# Patient Record
Sex: MALE | Race: WHITE | NOT HISPANIC OR LATINO | URBAN - METROPOLITAN AREA
[De-identification: names, ages, dates, MRNs, and addresses within clinical notes are randomized per-mention and may not be internally consistent; named-entity substitution may affect disease eponyms.]

---

## 2018-03-28 ENCOUNTER — OUTPATIENT (OUTPATIENT)
Dept: OUTPATIENT SERVICES | Facility: HOSPITAL | Age: 58
LOS: 1 days | End: 2018-03-28
Payer: MEDICARE

## 2018-03-28 VITALS
HEIGHT: 71 IN | SYSTOLIC BLOOD PRESSURE: 141 MMHG | OXYGEN SATURATION: 98 % | WEIGHT: 206.13 LBS | HEART RATE: 77 BPM | DIASTOLIC BLOOD PRESSURE: 86 MMHG | RESPIRATION RATE: 12 BRPM | TEMPERATURE: 98 F

## 2018-03-28 VITALS — WEIGHT: 210.98 LBS | HEIGHT: 71 IN

## 2018-03-28 DIAGNOSIS — Z98.89 OTHER SPECIFIED POSTPROCEDURAL STATES: Chronic | ICD-10-CM

## 2018-03-28 DIAGNOSIS — Z01.810 ENCOUNTER FOR PREPROCEDURAL CARDIOVASCULAR EXAMINATION: ICD-10-CM

## 2018-03-28 DIAGNOSIS — I20.9 ANGINA PECTORIS, UNSPECIFIED: ICD-10-CM

## 2018-03-28 LAB
ALBUMIN SERPL ELPH-MCNC: 4 G/DL — SIGNIFICANT CHANGE UP (ref 3.3–5.2)
ALP SERPL-CCNC: 67 U/L — SIGNIFICANT CHANGE UP (ref 40–120)
ALT FLD-CCNC: 29 U/L — SIGNIFICANT CHANGE UP
ANION GAP SERPL CALC-SCNC: 12 MMOL/L — SIGNIFICANT CHANGE UP (ref 5–17)
APTT BLD: 30 SEC — SIGNIFICANT CHANGE UP (ref 27.5–37.4)
AST SERPL-CCNC: 26 U/L — SIGNIFICANT CHANGE UP
BILIRUB SERPL-MCNC: 0.3 MG/DL — LOW (ref 0.4–2)
BUN SERPL-MCNC: 20 MG/DL — SIGNIFICANT CHANGE UP (ref 8–20)
CALCIUM SERPL-MCNC: 9.1 MG/DL — SIGNIFICANT CHANGE UP (ref 8.6–10.2)
CHLORIDE SERPL-SCNC: 105 MMOL/L — SIGNIFICANT CHANGE UP (ref 98–107)
CO2 SERPL-SCNC: 23 MMOL/L — SIGNIFICANT CHANGE UP (ref 22–29)
CREAT SERPL-MCNC: 1.09 MG/DL — SIGNIFICANT CHANGE UP (ref 0.5–1.3)
GLUCOSE SERPL-MCNC: 164 MG/DL — HIGH (ref 70–115)
HCT VFR BLD CALC: 43.2 % — SIGNIFICANT CHANGE UP (ref 42–52)
HGB BLD-MCNC: 14.7 G/DL — SIGNIFICANT CHANGE UP (ref 14–18)
INR BLD: 0.98 RATIO — SIGNIFICANT CHANGE UP (ref 0.88–1.16)
MCHC RBC-ENTMCNC: 29.8 PG — SIGNIFICANT CHANGE UP (ref 27–31)
MCHC RBC-ENTMCNC: 34 G/DL — SIGNIFICANT CHANGE UP (ref 32–36)
MCV RBC AUTO: 87.4 FL — SIGNIFICANT CHANGE UP (ref 80–94)
PLATELET # BLD AUTO: 172 K/UL — SIGNIFICANT CHANGE UP (ref 150–400)
POTASSIUM SERPL-MCNC: 4.8 MMOL/L — SIGNIFICANT CHANGE UP (ref 3.5–5.3)
POTASSIUM SERPL-SCNC: 4.8 MMOL/L — SIGNIFICANT CHANGE UP (ref 3.5–5.3)
PROT SERPL-MCNC: 7.1 G/DL — SIGNIFICANT CHANGE UP (ref 6.6–8.7)
PROTHROM AB SERPL-ACNC: 10.8 SEC — SIGNIFICANT CHANGE UP (ref 9.8–12.7)
RBC # BLD: 4.94 M/UL — SIGNIFICANT CHANGE UP (ref 4.6–6.2)
RBC # FLD: 13.2 % — SIGNIFICANT CHANGE UP (ref 11–15.6)
SODIUM SERPL-SCNC: 140 MMOL/L — SIGNIFICANT CHANGE UP (ref 135–145)
WBC # BLD: 7 K/UL — SIGNIFICANT CHANGE UP (ref 4.8–10.8)
WBC # FLD AUTO: 7 K/UL — SIGNIFICANT CHANGE UP (ref 4.8–10.8)

## 2018-03-28 PROCEDURE — 93010 ELECTROCARDIOGRAM REPORT: CPT

## 2018-03-28 PROCEDURE — 85027 COMPLETE CBC AUTOMATED: CPT

## 2018-03-28 PROCEDURE — 93005 ELECTROCARDIOGRAM TRACING: CPT

## 2018-03-28 PROCEDURE — 36415 COLL VENOUS BLD VENIPUNCTURE: CPT

## 2018-03-28 PROCEDURE — 85610 PROTHROMBIN TIME: CPT

## 2018-03-28 PROCEDURE — 85730 THROMBOPLASTIN TIME PARTIAL: CPT

## 2018-03-28 PROCEDURE — G0463: CPT

## 2018-03-28 PROCEDURE — 80053 COMPREHEN METABOLIC PANEL: CPT

## 2018-03-28 NOTE — H&P PST ADULT - ASSESSMENT
This is a 57 year old white male with PMHx of 12 ANDREEA to date with catheterizations in 2007 and 2009.  Patient now c/o increased SOB when walking up stairs and reports SOB at night while in bed.  CCS class 3-4.  PMHx significant for HTN, HLD, DMII, CAD, and past tobacco use approximately 12 pack year history, quit in 2003.    Patient for [facility] 7/3/2018.  (Patient to have BW for Hepatitis C in am on day of procedure)

## 2018-03-28 NOTE — ASU PATIENT PROFILE, ADULT - PMH
Carotid artery disease    Chronic back pain    Coronary artery disease    Diabetes mellitus    HTN (hypertension)    Hyperlipidemia    Mitral valve disease

## 2018-03-28 NOTE — H&P PST ADULT - HISTORY OF PRESENT ILLNESS
This is a 57 year old white male with PMHx of 12 ANDREEA to date with catheterizations in 2007 and 2009.  Patient now c/o increased SOB when walking up stairs and reports SOB at night while in bed.  CCS class 3-4.  PMHx significant for HTN, HLD, DMII, CAD, and past tobacco use approximately 12 pack year history, quit in 2003.    NST 3/15/2018:  Negative for evidence of ischemia and EF of 59%.    Echo 3/9/2018:  Mild LV hypertrophy, EF 55-60% and mild MV regurgitation.

## 2018-04-02 ENCOUNTER — INPATIENT (INPATIENT)
Facility: HOSPITAL | Age: 58
LOS: 0 days | Discharge: HOME | DRG: 246 | End: 2018-04-03
Attending: SPECIALIST | Admitting: SPECIALIST
Payer: MEDICARE

## 2018-04-02 ENCOUNTER — TRANSCRIPTION ENCOUNTER (OUTPATIENT)
Age: 58
End: 2018-04-02

## 2018-04-02 VITALS
HEART RATE: 66 BPM | RESPIRATION RATE: 18 BRPM | OXYGEN SATURATION: 97 % | DIASTOLIC BLOOD PRESSURE: 86 MMHG | SYSTOLIC BLOOD PRESSURE: 146 MMHG | TEMPERATURE: 98 F

## 2018-04-02 DIAGNOSIS — Z98.89 OTHER SPECIFIED POSTPROCEDURAL STATES: Chronic | ICD-10-CM

## 2018-04-02 DIAGNOSIS — I20.9 ANGINA PECTORIS, UNSPECIFIED: ICD-10-CM

## 2018-04-02 LAB
CHOLEST SERPL-MCNC: 158 MG/DL — SIGNIFICANT CHANGE UP (ref 110–199)
GLUCOSE BLDC GLUCOMTR-MCNC: 137 MG/DL — HIGH (ref 70–99)
GLUCOSE BLDC GLUCOMTR-MCNC: 203 MG/DL — HIGH (ref 70–99)
GLUCOSE BLDC GLUCOMTR-MCNC: 211 MG/DL — HIGH (ref 70–99)
HDLC SERPL-MCNC: 49 MG/DL — LOW
LIPID PNL WITH DIRECT LDL SERPL: 102 MG/DL — SIGNIFICANT CHANGE UP
TOTAL CHOLESTEROL/HDL RATIO MEASUREMENT: 3 RATIO — LOW (ref 3.4–9.6)
TRIGL SERPL-MCNC: 37 MG/DL — SIGNIFICANT CHANGE UP (ref 10–200)

## 2018-04-02 PROCEDURE — 93010 ELECTROCARDIOGRAM REPORT: CPT

## 2018-04-02 RX ORDER — DEXTROSE 50 % IN WATER 50 %
25 SYRINGE (ML) INTRAVENOUS ONCE
Qty: 0 | Refills: 0 | Status: DISCONTINUED | OUTPATIENT
Start: 2018-04-02 | End: 2018-04-03

## 2018-04-02 RX ORDER — ATORVASTATIN CALCIUM 80 MG/1
80 TABLET, FILM COATED ORAL AT BEDTIME
Qty: 0 | Refills: 0 | Status: DISCONTINUED | OUTPATIENT
Start: 2018-04-02 | End: 2018-04-03

## 2018-04-02 RX ORDER — ISOSORBIDE MONONITRATE 60 MG/1
30 TABLET, EXTENDED RELEASE ORAL DAILY
Qty: 0 | Refills: 0 | Status: DISCONTINUED | OUTPATIENT
Start: 2018-04-02 | End: 2018-04-03

## 2018-04-02 RX ORDER — HYDRALAZINE HCL 50 MG
5 TABLET ORAL ONCE
Qty: 0 | Refills: 0 | Status: COMPLETED | OUTPATIENT
Start: 2018-04-02 | End: 2018-04-02

## 2018-04-02 RX ORDER — DEXTROSE 50 % IN WATER 50 %
12.5 SYRINGE (ML) INTRAVENOUS ONCE
Qty: 0 | Refills: 0 | Status: DISCONTINUED | OUTPATIENT
Start: 2018-04-02 | End: 2018-04-03

## 2018-04-02 RX ORDER — ASPIRIN/CALCIUM CARB/MAGNESIUM 324 MG
325 TABLET ORAL ONCE
Qty: 0 | Refills: 0 | Status: COMPLETED | OUTPATIENT
Start: 2018-04-02 | End: 2018-04-02

## 2018-04-02 RX ORDER — ASPIRIN/CALCIUM CARB/MAGNESIUM 324 MG
81 TABLET ORAL DAILY
Qty: 0 | Refills: 0 | Status: DISCONTINUED | OUTPATIENT
Start: 2018-04-03 | End: 2018-04-03

## 2018-04-02 RX ORDER — GLUCAGON INJECTION, SOLUTION 0.5 MG/.1ML
1 INJECTION, SOLUTION SUBCUTANEOUS ONCE
Qty: 0 | Refills: 0 | Status: DISCONTINUED | OUTPATIENT
Start: 2018-04-02 | End: 2018-04-03

## 2018-04-02 RX ORDER — INSULIN GLARGINE 100 [IU]/ML
70 INJECTION, SOLUTION SUBCUTANEOUS
Qty: 0 | Refills: 0 | COMMUNITY

## 2018-04-02 RX ORDER — INSULIN LISPRO 100/ML
VIAL (ML) SUBCUTANEOUS
Qty: 0 | Refills: 0 | Status: DISCONTINUED | OUTPATIENT
Start: 2018-04-02 | End: 2018-04-03

## 2018-04-02 RX ORDER — ACETAMINOPHEN 500 MG
1000 TABLET ORAL ONCE
Qty: 0 | Refills: 0 | Status: COMPLETED | OUTPATIENT
Start: 2018-04-02 | End: 2018-04-02

## 2018-04-02 RX ORDER — METOPROLOL TARTRATE 50 MG
25 TABLET ORAL DAILY
Qty: 0 | Refills: 0 | Status: DISCONTINUED | OUTPATIENT
Start: 2018-04-02 | End: 2018-04-03

## 2018-04-02 RX ORDER — TICAGRELOR 90 MG/1
90 TABLET ORAL
Qty: 0 | Refills: 0 | Status: DISCONTINUED | OUTPATIENT
Start: 2018-04-02 | End: 2018-04-03

## 2018-04-02 RX ORDER — SODIUM CHLORIDE 9 MG/ML
1000 INJECTION, SOLUTION INTRAVENOUS
Qty: 0 | Refills: 0 | Status: DISCONTINUED | OUTPATIENT
Start: 2018-04-02 | End: 2018-04-03

## 2018-04-02 RX ORDER — INSULIN LISPRO 100/ML
VIAL (ML) SUBCUTANEOUS AT BEDTIME
Qty: 0 | Refills: 0 | Status: DISCONTINUED | OUTPATIENT
Start: 2018-04-02 | End: 2018-04-03

## 2018-04-02 RX ORDER — DEXTROSE 50 % IN WATER 50 %
1 SYRINGE (ML) INTRAVENOUS ONCE
Qty: 0 | Refills: 0 | Status: DISCONTINUED | OUTPATIENT
Start: 2018-04-02 | End: 2018-04-03

## 2018-04-02 RX ADMIN — Medication 25 MILLIGRAM(S): at 13:57

## 2018-04-02 RX ADMIN — Medication 25 MILLIGRAM(S): at 21:55

## 2018-04-02 RX ADMIN — Medication 1000 MILLIGRAM(S): at 13:56

## 2018-04-02 RX ADMIN — TICAGRELOR 90 MILLIGRAM(S): 90 TABLET ORAL at 21:52

## 2018-04-02 RX ADMIN — Medication 325 MILLIGRAM(S): at 10:40

## 2018-04-02 RX ADMIN — ATORVASTATIN CALCIUM 80 MILLIGRAM(S): 80 TABLET, FILM COATED ORAL at 21:51

## 2018-04-02 RX ADMIN — Medication 400 MILLIGRAM(S): at 13:35

## 2018-04-02 NOTE — DISCHARGE NOTE ADULT - CARE PROVIDER_API CALL
Tomas Strong), Cardiovascular Disease  [facility] [place] [address]  Phone: [phone]  Fax: [phone] Aureliano Strong), Cardiovascular Disease; Internal Medicine; Interventional Cardiology  [address] [facility]  [address] [place] [address]  Phone: [phone]  Fax: [phone]

## 2018-04-02 NOTE — DISCHARGE NOTE ADULT - PATIENT PORTAL LINK FT
You can access the [facility] Patient Portal, offered by [facility], by registering with the following website: [email or web address]

## 2018-04-02 NOTE — DISCHARGE NOTE ADULT - MEDICATION SUMMARY - MEDICATIONS TO STOP TAKING
I will STOP taking the medications listed below when I get home from the hospital:    lovastatin 40 mg oral tablet  -- 1 tab(s) by mouth once a day

## 2018-04-02 NOTE — DISCHARGE NOTE ADULT - HOSPITAL COURSE
This is a 57 year old white male with PMHx of 12 ANDREEA to date with catheterizations in 2007 and 2009.  Patient now c/o increased SOB when walking up stairs and reports SOB at night while in bed.  CCS class 3-4.  PMHx significant for HTN, HLD, DMII, CAD, and past tobacco use approximately 12 pack year history, quit in 2003.    NST 3/15/2018:  Negative for evidence of ischemia and EF of 59%.    Echo 3/9/2018:  Mild LV hypertrophy, EF 55-60% and mild MV regurgitation.      s/p LHC with successful PCI and 4 ANDREEA to the LAD and one ANDREEA to the LCX This is a 57 year old white male with PMHx of 12 ANDREEA to date with catheterizations in 2007 and 2009.  Patient now c/o increased SOB when walking up stairs and reports SOB at night while in bed.  CCS class 3-4.  PMHx significant for HTN, HLD, DMII, CAD, and past tobacco use approximately 12 pack year history, quit in 2003. He had a LHC which showed:  VENTRICLES: Analysis of regional contractile function demonstrated mild anterolateral hypokinesis. EF calculated by contrast ventriculography was 50 %.  CORONARY VESSELS: The coronary circulation is right dominant.  LM:     --  LM: Angiography showed minor luminal irregularities with no flow limiting lesions.  LAD:     --  Proximal LAD: There was a diffuse 80 % stenosis. The lesion was complex, eccentric, and mildly calcified. IVUS demonstrated a severe stenosis with an MLA of 2.5 mm2. There was JORDYN grade 3 flow through the vessel (brisk flow). This is a likely culprit for the patient's clinical presentation and was treated with an MALACHI 3.00 X 30MM drug-eluting stent and an MALACHI 3.50 X 8MM drug-eluting stent.  --  Mid LAD: There was a diffuse 80 % stenosis. The lesion was complex, eccentric, and mildly calcified. IVUS demonstrated a severe stenosis with an MLA of 2.4 mm2. There was JORDYN grade 3 flow through the vessel (brisk flow). This is a likely culprit for the patient's clinical presentation and was treated with an MALACHI 2.75 X 22MM drug-eluting stent and an MALACHI 2.50 X 18MM drug-eluting stent.  CX:     --  Distal circumflex: There was a tubular 80 % stenosis. The lesion was complex and eccentric. There was JORDYN grade 3 flow through the vessel (brisk flow). This is a likely culprit for the patient's clinical presentation and was treated MALACHI 2.50 X 15MM drug-eluting stent.  RCA:     --  RCA: Angiography showed minor luminal irregularities with no flow limiting lesions. There are patent stents in the proximal and mid RCA.    Plan:   ·	Follow up with Dr. Strong in 2 weeks.  ·	Brilinta 90mg BID, aspirin 81mg daily, Lipitor 80mg daily  ·	Continue other medications

## 2018-04-02 NOTE — DISCHARGE NOTE ADULT - CARE PLAN
Principal Discharge DX:	Carotid artery disease  Goal:	Maintain optimal cardiac health and quality of life  Assessment and plan of treatment:	No heavy lifting, driving, sex, tub baths, swimming, or any activity that submerges the lower half of the body in water for 48 hours.  Limited walking and stairs for 48 hours.    Change the bandaid after 24 hours and every 24 hours after that.  Keep the puncture site dry and covered with a bandaid until a scab forms.    Observe the site frequently.  If bleeding or a large lump (the size of a golf ball or bigger) occurs lie flat, apply continuous direct pressure just above the puncture site for at least 10 minutes, and notify your physician immediately.  If the bleeding cannot be controlled, call 911 immediately for assistance.  Notify your physician of pain, swelling or any drainage.    Notify your physician immediately if coldness, numbness, discoloration or pain in your foot occurs. Principal Discharge DX:	Carotid artery disease  Goal:	Maintain optimal cardiac health and quality of life  Assessment and plan of treatment:	Follow up in 2 weeks with Dr. Strong  Continue with all prescribed medications.  Don't stop your antiplatelet medication (Plavix/Effient/Brilinta) without asking your cardiologist first.  Follow your prescribed diet.

## 2018-04-02 NOTE — DISCHARGE NOTE ADULT - PLAN OF CARE
Maintain optimal cardiac health and quality of life No heavy lifting, driving, sex, tub baths, swimming, or any activity that submerges the lower half of the body in water for 48 hours.  Limited walking and stairs for 48 hours.    Change the bandaid after 24 hours and every 24 hours after that.  Keep the puncture site dry and covered with a bandaid until a scab forms.    Observe the site frequently.  If bleeding or a large lump (the size of a golf ball or bigger) occurs lie flat, apply continuous direct pressure just above the puncture site for at least 10 minutes, and notify your physician immediately.  If the bleeding cannot be controlled, call 911 immediately for assistance.  Notify your physician of pain, swelling or any drainage.    Notify your physician immediately if coldness, numbness, discoloration or pain in your foot occurs. Follow up in 2 weeks with Dr. Strong  Continue with all prescribed medications.  Don't stop your antiplatelet medication (Plavix/Effient/Brilinta) without asking your cardiologist first.  Follow your prescribed diet.

## 2018-04-02 NOTE — DISCHARGE NOTE ADULT - NS AS ACTIVITY OBS
Walking-Indoors allowed/No Heavy lifting/straining/Do not make important decisions/Do not drive or operate machinery/Showering allowed Showering allowed/Do not drive or operate machinery/Do not make important decisions/Walking-Outdoors allowed/Walking-Indoors allowed/No Heavy lifting/straining

## 2018-04-02 NOTE — DISCHARGE NOTE ADULT - PROCEDURE 1
Insertion of drug-eluting stent into left anterior descending coronary artery or branch of left ante

## 2018-04-02 NOTE — DISCHARGE NOTE ADULT - MEDICATION SUMMARY - MEDICATIONS TO TAKE
I will START or STAY ON the medications listed below when I get home from the hospital:    aspirin 81 mg oral tablet, chewable  -- 1 tab(s) by mouth once a day  -- Indication: For Coronary artery disease involving native coronary artery of native heart with unstable angina pector    isosorbide mononitrate 30 mg oral tablet, extended release  -- 1 tab(s) by mouth once a day (in the morning)  -- Indication: For Coronary artery disease involving native coronary artery of native heart with unstable angina pector    glipiZIDE 10 mg oral tablet  -- 1 tab(s) by mouth 2 times a day  -- Indication: For Diabetes mellitus    metFORMIN 1000 mg oral tablet  -- 1 tab(s) by mouth 2 times a day. RESTART ON APRIL 4, 2018  -- Indication: For Diabetes mellitus    Lantus 100 units/mL subcutaneous solution  -- 70 unit(s) subcutaneous once a day (at bedtime)  -- Indication: For Diabetes mellitus    atorvastatin 80 mg oral tablet  -- 1 tab(s) by mouth once a day (at bedtime)  -- Indication: For Hyperlipidemia    ticagrelor 90 mg oral tablet  -- 1 tab(s) by mouth 2 times a day  -- Indication: For Coronary artery disease involving native coronary artery of native heart with unstable angina pector    metoprolol succinate 25 mg oral tablet, extended release  -- 1 tab(s) by mouth once a day  -- Indication: For Hypertension

## 2018-04-03 VITALS — RESPIRATION RATE: 18 BRPM | HEART RATE: 73 BPM | DIASTOLIC BLOOD PRESSURE: 74 MMHG | SYSTOLIC BLOOD PRESSURE: 136 MMHG

## 2018-04-03 LAB
ANION GAP SERPL CALC-SCNC: 11 MMOL/L — SIGNIFICANT CHANGE UP (ref 5–17)
BASOPHILS # BLD AUTO: 0 K/UL — SIGNIFICANT CHANGE UP (ref 0–0.2)
BASOPHILS NFR BLD AUTO: 0.1 % — SIGNIFICANT CHANGE UP (ref 0–2)
BUN SERPL-MCNC: 18 MG/DL — SIGNIFICANT CHANGE UP (ref 8–20)
CALCIUM SERPL-MCNC: 8.4 MG/DL — LOW (ref 8.6–10.2)
CHLORIDE SERPL-SCNC: 102 MMOL/L — SIGNIFICANT CHANGE UP (ref 98–107)
CO2 SERPL-SCNC: 22 MMOL/L — SIGNIFICANT CHANGE UP (ref 22–29)
CREAT SERPL-MCNC: 0.91 MG/DL — SIGNIFICANT CHANGE UP (ref 0.5–1.3)
EOSINOPHIL # BLD AUTO: 0.2 K/UL — SIGNIFICANT CHANGE UP (ref 0–0.5)
EOSINOPHIL NFR BLD AUTO: 1.9 % — SIGNIFICANT CHANGE UP (ref 0–5)
GLUCOSE BLDC GLUCOMTR-MCNC: 199 MG/DL — HIGH (ref 70–99)
GLUCOSE SERPL-MCNC: 165 MG/DL — HIGH (ref 70–115)
HBA1C BLD-MCNC: 8.7 % — HIGH (ref 4–5.6)
HCT VFR BLD CALC: 44.6 % — SIGNIFICANT CHANGE UP (ref 42–52)
HGB BLD-MCNC: 15 G/DL — SIGNIFICANT CHANGE UP (ref 14–18)
LYMPHOCYTES # BLD AUTO: 1.6 K/UL — SIGNIFICANT CHANGE UP (ref 1–4.8)
LYMPHOCYTES # BLD AUTO: 18.8 % — LOW (ref 20–55)
MCHC RBC-ENTMCNC: 29.2 PG — SIGNIFICANT CHANGE UP (ref 27–31)
MCHC RBC-ENTMCNC: 33.6 G/DL — SIGNIFICANT CHANGE UP (ref 32–36)
MCV RBC AUTO: 86.9 FL — SIGNIFICANT CHANGE UP (ref 80–94)
MONOCYTES # BLD AUTO: 0.7 K/UL — SIGNIFICANT CHANGE UP (ref 0–0.8)
MONOCYTES NFR BLD AUTO: 7.7 % — SIGNIFICANT CHANGE UP (ref 3–10)
NEUTROPHILS # BLD AUTO: 6.1 K/UL — SIGNIFICANT CHANGE UP (ref 1.8–8)
NEUTROPHILS NFR BLD AUTO: 71.3 % — SIGNIFICANT CHANGE UP (ref 37–73)
PLATELET # BLD AUTO: 183 K/UL — SIGNIFICANT CHANGE UP (ref 150–400)
POTASSIUM SERPL-MCNC: 4.4 MMOL/L — SIGNIFICANT CHANGE UP (ref 3.5–5.3)
POTASSIUM SERPL-SCNC: 4.4 MMOL/L — SIGNIFICANT CHANGE UP (ref 3.5–5.3)
RBC # BLD: 5.13 M/UL — SIGNIFICANT CHANGE UP (ref 4.6–6.2)
RBC # FLD: 13.2 % — SIGNIFICANT CHANGE UP (ref 11–15.6)
SODIUM SERPL-SCNC: 135 MMOL/L — SIGNIFICANT CHANGE UP (ref 135–145)
WBC # BLD: 8.6 K/UL — SIGNIFICANT CHANGE UP (ref 4.8–10.8)
WBC # FLD AUTO: 8.6 K/UL — SIGNIFICANT CHANGE UP (ref 4.8–10.8)

## 2018-04-03 PROCEDURE — 82962 GLUCOSE BLOOD TEST: CPT

## 2018-04-03 PROCEDURE — C1887: CPT

## 2018-04-03 PROCEDURE — 93005 ELECTROCARDIOGRAM TRACING: CPT

## 2018-04-03 PROCEDURE — 92979 ENDOLUMINL IVUS OCT C EA: CPT | Mod: LD

## 2018-04-03 PROCEDURE — 36415 COLL VENOUS BLD VENIPUNCTURE: CPT

## 2018-04-03 PROCEDURE — C1874: CPT

## 2018-04-03 PROCEDURE — C9600: CPT | Mod: LC

## 2018-04-03 PROCEDURE — C1760: CPT

## 2018-04-03 PROCEDURE — 80061 LIPID PANEL: CPT

## 2018-04-03 PROCEDURE — 92978 ENDOLUMINL IVUS OCT C 1ST: CPT | Mod: LC

## 2018-04-03 PROCEDURE — 80048 BASIC METABOLIC PNL TOTAL CA: CPT

## 2018-04-03 PROCEDURE — C1753: CPT

## 2018-04-03 PROCEDURE — 83036 HEMOGLOBIN GLYCOSYLATED A1C: CPT

## 2018-04-03 PROCEDURE — C1769: CPT

## 2018-04-03 PROCEDURE — C1894: CPT

## 2018-04-03 PROCEDURE — 99153 MOD SED SAME PHYS/QHP EA: CPT

## 2018-04-03 PROCEDURE — 93010 ELECTROCARDIOGRAM REPORT: CPT

## 2018-04-03 PROCEDURE — 85027 COMPLETE CBC AUTOMATED: CPT

## 2018-04-03 PROCEDURE — 93458 L HRT ARTERY/VENTRICLE ANGIO: CPT | Mod: XU

## 2018-04-03 PROCEDURE — 99152 MOD SED SAME PHYS/QHP 5/>YRS: CPT

## 2018-04-03 RX ORDER — ATORVASTATIN CALCIUM 80 MG/1
1 TABLET, FILM COATED ORAL
Qty: 30 | Refills: 0 | OUTPATIENT
Start: 2018-04-03 | End: 2018-05-02

## 2018-04-03 RX ORDER — TICAGRELOR 90 MG/1
1 TABLET ORAL
Qty: 180 | Refills: 3 | OUTPATIENT
Start: 2018-04-03 | End: 2019-03-28

## 2018-04-03 RX ORDER — ASPIRIN/CALCIUM CARB/MAGNESIUM 324 MG
1 TABLET ORAL
Qty: 0 | Refills: 0 | COMMUNITY
Start: 2018-04-03

## 2018-04-03 RX ADMIN — Medication 1: at 07:49

## 2018-04-03 NOTE — PROGRESS NOTE ADULT - SUBJECTIVE AND OBJECTIVE BOX
Department of Cardiology                                                                  [facility]/[address]                                                            Telephone: [phone]. Fax:[phone]                                                                                         PCI NOTE       Subjective:  57y  Male who had a left heart catheterization which showed:  VENTRICLES: Analysis of regional contractile function demonstrated mild anterolateral hypokinesis. EF calculated by contrast ventriculography was 50 %.  CORONARY VESSELS: The coronary circulation is right dominant.  LM:     --  LM: Angiography showed minor luminal irregularities with no flow limiting lesions.  LAD:     --  Proximal LAD: There was a diffuse 80 % stenosis. The lesion was complex, eccentric, and mildly calcified. IVUS demonstrated a severe stenosis with an MLA of 2.5 mm2. There was JORDYN grade 3 flow through the vessel (brisk flow). This is a likely culprit for the patient's clinical presentation and was treated with an MALACHI 3.00 X 30MM drug-eluting stent and an MALACHI 3.50 X 8MM drug-eluting stent.  --  Mid LAD: There was a diffuse 80 % stenosis. The lesion was complex, eccentric, and mildly calcified. IVUS demonstrated a severe stenosis with an MLA of 2.4 mm2. There was JORDYN grade 3 flow through the vessel (brisk flow). This is a likely culprit for the patient's clinical presentation and was treated with an MALACHI 2.75 X 22MM drug-eluting stent and an MALACHI 2.50 X 18MM drug-eluting stent.  CX:     --  Distal circumflex: There was a tubular 80 % stenosis. The lesion was complex and eccentric. There was JORDYN grade 3 flow through the vessel (brisk flow). This is a likely culprit for the patient's clinical presentation and was treated MALACHI 2.50 X 15MM drug-eluting stent.  RCA:     --  RCA: Angiography showed minor luminal irregularities with no flow limiting lesions. There are patent stents in the proximal and mid RCA.    PAST MEDICAL & SURGICAL HISTORY:  Mitral valve disease  Hyperlipidemia  Carotid artery disease  Diabetes mellitus  Chronic back pain  HTN (hypertension)  Coronary artery disease  History of bilateral inguinal hernia repair  History of back surgery    FAMILY HISTORY:  Family history of cancer  Family history of diabetes mellitus    Home Medications:  aspirin 325 mg oral tablet: 1 tab(s) orally once a day (02 Apr 2018 10:47)  glipiZIDE 10 mg oral tablet: 1 tab(s) orally 2 times a day (02 Apr 2018 10:47)  isosorbide mononitrate 30 mg oral tablet, extended release: 1 tab(s) orally once a day (in the morning) (02 Apr 2018 10:47)  Lantus 100 units/mL subcutaneous solution: 70 unit(s) subcutaneous once a day (at bedtime) (02 Apr 2018 10:47)  lovastatin 40 mg oral tablet: 1 tab(s) orally once a day (02 Apr 2018 10:47)  metFORMIN 1000 mg oral tablet: 1 tab(s) orally 2 times a day (02 Apr 2018 10:47)  metoprolol succinate 25 mg oral tablet, extended release: 1 tab(s) orally once a day (02 Apr 2018 10:47)    HPI: This is a 57 year old white male with PMHx of 12 ANDREEA to date with catheterizations in 2007 and 2009.  Patient now c/o increased SOB when walking up stairs and reports SOB at night while in bed.  CCS class 3-4.  PMHx significant for HTN, HLD, DMII, CAD, and past tobacco use approximately 12 pack year history, quit in 2003.    General: No fatigue, no fevers/chills  Respiratory: No dyspnea, no cough, no wheeze  CV: No chest pain, no palpitations  Abd: No nausea  Neuro: No headache, no dizziness    Objective:  Vital Signs Last 24 Hrs  T(C): 36.7 (03 Apr 2018 05:45), Max: 36.9 (02 Apr 2018 23:30)  T(F): 98 (03 Apr 2018 05:45), Max: 98.4 (02 Apr 2018 23:30)  HR: 68 (03 Apr 2018 05:45) (50 - 71)  BP: 132/75 (03 Apr 2018 05:45) (132/75 - 176/104)  RR: 20 (03 Apr 2018 05:45) (16 - 20)  SpO2: 96% (02 Apr 2018 21:30) (95% - 97%)    CM: SR  Neuro: A&OX3, CN 2-12 intact  HEENT: NC, AT  Lungs: CTA B/L  CV: S1, S2, no murmur, RRR  Abd: Soft  Right Groin: Soft, no bleeding, no hematoma  Extremity: + distal pulses  EKG:                         15.0   8.6   )-----------( 183      ( 03 Apr 2018 06:34 )             44.6     04-03    135  |  102  |  18.0  ----------------------<  165  4.4   |  22.0  |  0.91    Ca    8.4      03 Apr 2018 06:34
INTERVAL HISTORY: Denies CP,SOB, palpitation  	  MEDICATIONS:  isosorbide   mononitrate ER Tablet (IMDUR) 30 milliGRAM(s) Oral daily  metoprolol succinate ER 25 milliGRAM(s) Oral daily  aluminum hydroxide/magnesium hydroxide/simethicone Suspension 30 milliLiter(s) Oral every 4 hours PRN  atorvastatin 80 milliGRAM(s) Oral at bedtime  dextrose 50% Injectable 12.5 Gram(s) IV Push once  dextrose 50% Injectable 25 Gram(s) IV Push once  dextrose 50% Injectable 25 Gram(s) IV Push once  dextrose Gel 1 Dose(s) Oral once PRN  glucagon  Injectable 1 milliGRAM(s) IntraMuscular once PRN  insulin lispro (HumaLOG) corrective regimen sliding scale   SubCutaneous three times a day before meals  insulin lispro (HumaLOG) corrective regimen sliding scale   SubCutaneous at bedtime  aspirin  chewable 81 milliGRAM(s) Oral daily  dextrose 5%. 1000 milliLiter(s) IV Continuous <Continuous>  ticagrelor 90 milliGRAM(s) Oral two times a day        PHYSICAL EXAM:  T(C): 36.7 (04-03-18 @ 05:45), Max: 36.9 (04-02-18 @ 23:30)  HR: 73 (04-03-18 @ 07:54) (50 - 73)  BP: 136/74 (04-03-18 @ 07:54) (132/75 - 176/104)  RR: 18 (04-03-18 @ 07:54) (16 - 20)  SpO2: 96% (04-02-18 @ 21:30) (95% - 97%)  Wt(kg): --  I&O's Summary    Height (cm): 180.3 (04-02 @ 11:04)  Weight (kg): 95.254 (04-02 @ 11:04)  BMI (kg/m2): 29.3 (04-02 @ 11:04)  BSA (m2): 2.15 (04-02 @ 11:04)    Appearance: Normal		  Cardiovascular: Normal S1 S2, No JVD, No murmurs, No edema  Respiratory: Lungs clear to auscultation	  Psychiatry: A & O x 3, Mood & affect appropriate  Gastrointestinal:  Soft, Non-tender, + BS	  Skin: No rashes, No ecchymoses, No cyanosis  Neurologic: Non-focal  Extremities: Normal range of motion, No clubbing, cyanosis or edema, Cath site clean, no hematoma.  Vascular: Peripheral pulses palpable 2+ bilaterally    TELEMETRY: 	SR    	  LABS:	 	                          15.0   8.6   )-----------( 183      ( 03 Apr 2018 06:34 )             44.6     04-03    135  |  102  |  18.0  ----------------------------<  165<H>  4.4   |  22.0  |  0.91    Ca    8.4<L>      03 Apr 2018 06:34        ASSESSMENT/PLAN: CAD of the native LAD and LCX of the native heart with unstable angina.      S/P Left heart catheterization and PCI with ANDREEA of the pLAD, mLAD, and dLCX  Discharge home today. Follow up as an outpatient with: [facility]
Nurse Practitioner Progress note:   s/p [facility] with successful PCI and 4 ANDREEA to the LAD and one ANDREEA to the LCX   Patient feels well.  Denies chest pain, shortness of breath, dizziness or palpitations at this time  All other ROS unremarkable    Pt A&O x3, no neurological deficits, c/o 4/10 CP  Lungs CTA  S1S2 no MRG  Right groin procedure site CDI with mynx.  no bleeding, no hematoma, site soft, non tender, positive pedal pulses bilaterally      T(C): 36.6 (04-02-18 @ 10:49), Max: 36.6 (04-02-18 @ 10:49)  HR: 55 (04-02-18 @ 14:25) (55 - 66)  BP: 142/84 (04-02-18 @ 14:25) (142/84 - 176/104)  RR: 18 (04-02-18 @ 14:25) (18 - 18)  SpO2: 95% (04-02-18 @ 14:25) (95% - 97%)    Post Procedure EKG:  Sinus Rhythm      MEDICATIONS  (STANDING):  aspirin  chewable 81 milliGRAM(s) Oral daily  atorvastatin 80 milliGRAM(s) Oral at bedtime  dextrose 5%. 1000 milliLiter(s) (50 mL/Hr) IV Continuous <Continuous>  dextrose 50% Injectable 12.5 Gram(s) IV Push once  dextrose 50% Injectable 25 Gram(s) IV Push once  dextrose 50% Injectable 25 Gram(s) IV Push once  insulin lispro (HumaLOG) corrective regimen sliding scale   SubCutaneous three times a day before meals  insulin lispro (HumaLOG) corrective regimen sliding scale   SubCutaneous at bedtime  isosorbide   mononitrate ER Tablet (IMDUR) 30 milliGRAM(s) Oral daily  metoprolol succinate ER 25 milliGRAM(s) Oral daily  ticagrelor 90 milliGRAM(s) Oral two times a day    MEDICATIONS  (PRN):  dextrose Gel 1 Dose(s) Oral once PRN Blood Glucose LESS THAN 70 milliGRAM(s)/deciliter  glucagon  Injectable 1 milliGRAM(s) IntraMuscular once PRN Glucose LESS THAN 70 milligrams/deciliter        HPI: This is a 57 year old white male with PMHx of 12 ANDREEA to date with catheterizations in 2007 and 2009.  Patient now c/o increased SOB when walking up stairs and reports SOB at night while in bed.  CCS class 3-4.  PMHx significant for HTN, HLD, DMII, CAD, and past tobacco use approximately 12 pack year history, quit in 2003.    NST 3/15/2018:  Negative for evidence of ischemia and EF of 59%.    Echo 3/9/2018:  Mild LV hypertrophy, EF 55-60% and mild MV regurgitation.    now s/p [facility] with succesful PCI and ANDREEA to    ASSESSMENT/PLAN:    Coronary artery disease  -Admit to telemetry                                                 -Performed > or = 2 vessel system PCI  	                                  -Bifurcation lesion, Long lesion (> or = 28 mm)    -VS, labs, diet, activity as per PCI orders  -IV hydration  -Encourage PO fluids  -Aspirin 81 mg PO daily  -Brilinta 90mg PO twice daily  -Metoprolol 25 mg PO daily  -atorvastatin 80mg PO QHS   -Plan of care discussed with patient, family and MD  -likely d/c in AM if patient remains stable overnight  -NP to see in AM to evaluate labs, EKG and procedure site check  -Follow-up with attending  Dr Strong  within 1 week  -Discussed therapeutic lifestyle changes to reduce risk factors such as following a cardiac diet, weight loss, maintaining a healthy weight, exercise, smoking cessation, medication compliance, and regular follow-up  with MD to know your numbers (BP, cholesterol, weight, and glucose)

## 2018-04-03 NOTE — PROGRESS NOTE ADULT - ASSESSMENT
Procedure: Left heart catheterization and PCI with ANDREEA of the pLAD, mLAD, and dLCX  Discharge Diagnosis: CAD of the native LAD and LCX of the native heart with unstable angina    1. Discharge home today    2. Follow up as an outpatient with: [facility]    3. Post procedure teaching done including importance of medication adherence and access site care and monitoring.    4. DAPT: Brilinta 90mg twice daily and aspirin 81mg daily    5. Statin: Lipitor 80mg daily    6. Beta Blocker: Toprol 25mg daily    7. Imdur 30mg daily     8. Hold metformin and restart on April 4, 2018.

## 2018-04-04 RX ORDER — METFORMIN HYDROCHLORIDE 850 MG/1
1 TABLET ORAL
Qty: 0 | Refills: 0 | COMMUNITY
Start: 2018-04-04

## 2018-04-12 PROBLEM — Z00.00 ENCOUNTER FOR PREVENTIVE HEALTH EXAMINATION: Status: ACTIVE | Noted: 2018-04-12

## 2018-05-03 ENCOUNTER — APPOINTMENT (OUTPATIENT)
Dept: PULMONOLOGY | Facility: CLINIC | Age: 58
End: 2018-05-03
Payer: MEDICARE

## 2018-05-03 VITALS
OXYGEN SATURATION: 97 % | HEART RATE: 79 BPM | SYSTOLIC BLOOD PRESSURE: 118 MMHG | BODY MASS INDEX: 29.49 KG/M2 | WEIGHT: 206 LBS | DIASTOLIC BLOOD PRESSURE: 78 MMHG | HEIGHT: 70 IN | RESPIRATION RATE: 16 BRPM

## 2018-05-03 DIAGNOSIS — Z80.1 FAMILY HISTORY OF MALIGNANT NEOPLASM OF TRACHEA, BRONCHUS AND LUNG: ICD-10-CM

## 2018-05-03 DIAGNOSIS — Z87.39 PERSONAL HISTORY OF OTHER DISEASES OF THE MUSCULOSKELETAL SYSTEM AND CONNECTIVE TISSUE: ICD-10-CM

## 2018-05-03 DIAGNOSIS — Z82.49 FAMILY HISTORY OF ISCHEMIC HEART DISEASE AND OTHER DISEASES OF THE CIRCULATORY SYSTEM: ICD-10-CM

## 2018-05-03 DIAGNOSIS — Z87.891 PERSONAL HISTORY OF NICOTINE DEPENDENCE: ICD-10-CM

## 2018-05-03 DIAGNOSIS — Z86.39 PERSONAL HISTORY OF OTHER ENDOCRINE, NUTRITIONAL AND METABOLIC DISEASE: ICD-10-CM

## 2018-05-03 DIAGNOSIS — R06.02 SHORTNESS OF BREATH: ICD-10-CM

## 2018-05-03 DIAGNOSIS — Z86.79 PERSONAL HISTORY OF OTHER DISEASES OF THE CIRCULATORY SYSTEM: ICD-10-CM

## 2018-05-03 DIAGNOSIS — Z83.3 FAMILY HISTORY OF DIABETES MELLITUS: ICD-10-CM

## 2018-05-03 PROCEDURE — 85018 HEMOGLOBIN: CPT | Mod: QW

## 2018-05-03 PROCEDURE — 99205 OFFICE O/P NEW HI 60 MIN: CPT | Mod: 25

## 2018-05-03 PROCEDURE — 94729 DIFFUSING CAPACITY: CPT

## 2018-05-03 PROCEDURE — 94664 DEMO&/EVAL PT USE INHALER: CPT | Mod: 59

## 2018-05-03 PROCEDURE — 94727 GAS DIL/WSHOT DETER LNG VOL: CPT

## 2018-05-03 PROCEDURE — 94060 EVALUATION OF WHEEZING: CPT

## 2018-05-03 RX ORDER — INSULIN GLARGINE 100 [IU]/ML
100 INJECTION, SOLUTION SUBCUTANEOUS
Qty: 60 | Refills: 0 | Status: ACTIVE | COMMUNITY
Start: 2018-04-16

## 2018-05-03 RX ORDER — METOPROLOL SUCCINATE 25 MG/1
25 TABLET, EXTENDED RELEASE ORAL
Qty: 90 | Refills: 0 | Status: ACTIVE | COMMUNITY
Start: 2018-02-08

## 2018-05-03 RX ORDER — SYRING-NEEDL,DISP,INSUL,0.3 ML 31GX15/64"
31G X 15/64" SYRINGE, EMPTY DISPOSABLE MISCELLANEOUS
Qty: 100 | Refills: 0 | Status: ACTIVE | COMMUNITY
Start: 2017-11-16

## 2018-05-03 RX ORDER — SYRINGE AND NEEDLE,INSULIN,1ML 31 GX5/16"
31G X 5/16" SYRINGE, EMPTY DISPOSABLE MISCELLANEOUS
Qty: 100 | Refills: 0 | Status: ACTIVE | COMMUNITY
Start: 2018-02-16

## 2018-05-03 RX ORDER — ATORVASTATIN CALCIUM 80 MG/1
80 TABLET, FILM COATED ORAL
Qty: 30 | Refills: 0 | Status: ACTIVE | COMMUNITY
Start: 2018-04-03

## 2018-05-03 RX ORDER — GLIPIZIDE 10 MG/1
10 TABLET ORAL
Qty: 90 | Refills: 0 | Status: ACTIVE | COMMUNITY
Start: 2018-01-26

## 2018-05-03 RX ORDER — ISOSORBIDE MONONITRATE 30 MG/1
30 TABLET, EXTENDED RELEASE ORAL
Qty: 90 | Refills: 0 | Status: ACTIVE | COMMUNITY
Start: 2018-02-08

## 2018-05-03 RX ORDER — TICAGRELOR 90 MG/1
90 TABLET ORAL
Qty: 180 | Refills: 0 | Status: ACTIVE | COMMUNITY
Start: 2018-04-03

## 2018-05-03 RX ORDER — LOVASTATIN 40 MG/1
40 TABLET ORAL
Qty: 90 | Refills: 0 | Status: DISCONTINUED | COMMUNITY
Start: 2018-01-26 | End: 2018-05-03

## 2018-05-03 RX ORDER — METFORMIN HYDROCHLORIDE 1000 MG/1
1000 TABLET, COATED ORAL
Qty: 180 | Refills: 0 | Status: ACTIVE | COMMUNITY
Start: 2018-03-21

## 2018-05-22 ENCOUNTER — APPOINTMENT (OUTPATIENT)
Dept: PULMONOLOGY | Facility: CLINIC | Age: 58
End: 2018-05-22

## 2020-12-29 NOTE — H&P PST ADULT - GASTROINTESTINAL
Occupational Therapy Daily Treatment    Visit Count: 2    Precautions: None    SUBJECTIVE   Present and reporting subjective information: mother  [place] to horse barn today and after 30 minutes, Marimar became very overstimulated and her mother needed to remove her from the main area into a quieter space.   Current Pain/Behaviors: No pain reported   Functional Change: Attempting more calming activities following movement however Marimar becomes frustrated easily. Her mother also reported having the most difficulty with transitions.    OBJECTIVE   Very shy and attached to mother at the beginning of session  Improved eye contact as time progressed   Will to attempt most activities presented   Required extra time to warm to therapist and play activities     Treatment   Therapeutic Activity:  -sort and snap activity board with therapist sitting next to Marimar with engagement when tolerated  -Bowling activity with therapist and mother for turn taking and appropriate play.   -Jumping on bosu ball for proprioceptive input to lower extremities for improved sensory input.  -pulling apart squigs for heavy work to upper extremities without movement.       Skilled input: verbal instruction/cues, education/instruction on transitions, visual schedule and oral sensory input     Home Program:   Discussed use of timer and visual schedule to be utilized to decrease meltdowns with transitions. Providing oral input to assist with calming other than a snack. Controlled movement activities versus letting Marimar jump excessively     Suggestions for next session as indicated: progress per plan of care    ASSESSMENT   -Warmed to therapist well though continues to demonstrate a decrease in verbal communication.  -sensory seeking behaviors noted with w-sitting and slamming bottom into ground as well as stomping on ground for proprioceptive input.   -began demonstrating overstimulated behaviors following jumping on bosu ball.     Continued  occupational therapy services are recommended     Pain/behaviors after treatment: None reported   Result of above outlined education: Verbalizes understanding    Procedures and total treatment time documented in Time Entry flowsheet.   details… detailed exam

## 2021-07-19 ENCOUNTER — APPOINTMENT (OUTPATIENT)
Dept: ORTHOPEDIC SURGERY | Facility: CLINIC | Age: 61
End: 2021-07-19
Payer: MEDICARE

## 2021-07-19 VITALS
HEART RATE: 64 BPM | WEIGHT: 210 LBS | BODY MASS INDEX: 29.4 KG/M2 | SYSTOLIC BLOOD PRESSURE: 152 MMHG | DIASTOLIC BLOOD PRESSURE: 82 MMHG | HEIGHT: 71 IN

## 2021-07-19 DIAGNOSIS — M25.511 PAIN IN RIGHT SHOULDER: ICD-10-CM

## 2021-07-19 PROCEDURE — 99203 OFFICE O/P NEW LOW 30 MIN: CPT

## 2021-07-19 NOTE — PHYSICAL EXAM
[de-identified] : Oriented to time, place, person\par Mood: Normal\par Affect: Normal\par Appearance: Healthy, well appearing, no acute distress.\par Gait: Normal\par Assistive Devices: None\par \par Right shoulder exam:\par \par Inspection: No malalignment, No defects, No atrophy\par Skin: No masses, No lesions\par Neck: Negative Spurling, full ROM, no pain with ROM\par AROM: FF to 150, abduction to 90, ER to 45, IR to lower lumbar \par Painful arc ROM: Pain with terminal motion\par Tenderness: No bicipital tenderness, no tenderness to greater tuberosity/RTC insertion, no anterior shoulder/lesser tuberosity tenderness\par Strength: 5/5 ER, 5/5 IR in adduction, 4/5 supraspinatus testing, negative [place]'s test\par AC joint: No TTP/pain with cross arm testing\par Biceps: Speed Negative, Yergason Negative \par Impingement test: + Baron, + Neer\par Vasc: 2+ radial pulse \par Stability: Stable \par Neuro: AIN, PIN, Ulnar nerve intact to motor\par Sensation: Intact to light touch throughout  [de-identified] : Images were reviewed from  dated 6.4.2021.\par \par 3 views of right shoulder were obtained today, 07/19/2021, that show no acute fracture or dislocation. There is no glenohumeral and mild AC joint degenerative change seen. Type II acromion. There is no significant malalignment. No significant other obvious osseous abnormality, otherwise unremarkable.

## 2021-07-19 NOTE — DISCUSSION/SUMMARY
[de-identified] : 59 y/o male with right shoulder pain. \par \par Clinically, patient has painful ROM and mild loss of motion. A discussion was had with the patient regarding potential sources of inflammatory shoulder discomfort; including rotator cuff tendon dysfunction (including tendonitis vs. internal structural damage), subdeltoid/subacromial bursitis, or impingement of the rotator cuff at the acromion. Other contributing sources of pain may be the acromioclavicular joint or long head of the biceps tendon. Irritation is typically caused either by overhead repetitive activity or as a result of minor injury. \par \par Discussed short-term and long-term outcomes as well as the goal of treatment to reduce pain and restore function. Nonsurgical treatment is typically first-line therapy that may take weeks to months to resolve symptoms; includes rest from overhead activities, NSAIDs, home exercise program versus physical therapy to restore normal strength/ROM/function of the shoulder, and possible corticosteroid injection. Also discussed the role of arthroscopic surgical intervention when nonsurgical treatment does not adequately relieve pain/inflammation. \par \par Recommendations: Trial of conservative modalities as above (rest from overhead activity and activity avoidance, ice, NSAIDs if tolerated. \par \par MRI Rx given to patient to further delineate any internal structural derangement to the shoulder. This will allow for better understanding of the severity of disease, and allow for better stratification of treatment strategies (surgical vs. non-surgical). Patient is agreeable to further imaging. \par \par Followup: After MRI

## 2021-07-19 NOTE — ADDENDUM
[FreeTextEntry1] : This note was written by Alysha Acevedo on 07/19/2021 acting solely as a scribe for Dr. Leonard Sandoval.\par \par All medical record entries made by the Scribe were at my, Dr. Leonard Sandvoal, direction and personally dictated by me on 07/19/2021. I have personally reviewed the chart and agree that the record accurately reflects my personal performance of the history, physical exam, assessment and plan.

## 2021-07-19 NOTE — HISTORY OF PRESENT ILLNESS
[de-identified] : 60 year old male presents today with right shoulder pain since February 2021. Slipped on his stoop and held on to the rail to prevent his fall. The pain is brought on with cross body movement pain and laying of the right side. Localizes pain to the lateral shoulder/arm. Voltaren gel is helpful temporary. Reports pinching and clicking. Denies numbness or tingling. No prior injuries.  Patient reports that pain wakes him up at night.\par \par The patient's past medical history, past surgical history, medications and allergies were reviewed by me today with the patient and documented accordingly. In addition, the patient's family and social history, which were noncontributory to this visit, were reviewed also.

## 2021-07-28 ENCOUNTER — APPOINTMENT (OUTPATIENT)
Dept: ORTHOPEDIC SURGERY | Facility: CLINIC | Age: 61
End: 2021-07-28
Payer: MEDICARE

## 2021-07-28 PROCEDURE — 99214 OFFICE O/P EST MOD 30 MIN: CPT

## 2021-07-28 NOTE — HISTORY OF PRESENT ILLNESS
[de-identified] : 60 year old diabetic male presents today for follow up of right shoulder pain since February 2021. He obtained MRI and here for review of results. Slipped on his stoop and held on to the rail to prevent his fall. The pain is brought on with cross body movement pain and laying of the right side. Localizes pain to the lateral shoulder/arm. Voltaren gel is helpful temporary. Reports pinching and clicking. Denies numbness or tingling. No prior injuries.  Patient reports that pain wakes him up at night.

## 2021-07-28 NOTE — PHYSICAL EXAM
[de-identified] : Oriented to time, place, person\par Mood: Normal\par Affect: Normal\par Appearance: Healthy, well appearing, no acute distress.\par Gait: Normal\par Assistive Devices: None\par \par Right shoulder exam:\par \par Inspection: No malalignment, No defects, No atrophy\par Skin: No masses, No lesions\par Neck: Negative Spurling, full ROM, no pain with ROM\par AROM: FF to 150, abduction to 90, ER to 45, IR to lower lumbar \par Painful arc ROM: Pain with terminal motion\par Tenderness: No bicipital tenderness, no tenderness to greater tuberosity/RTC insertion, no anterior shoulder/lesser tuberosity tenderness\par Strength: 5/5 ER, 5/5 IR in adduction, 4/5 supraspinatus testing, negative [place]'s test\par AC joint: No TTP/pain with cross arm testing\par Biceps: Speed Negative, Yergason Negative \par Impingement test: + Baron, + Neer\par Vasc: 2+ radial pulse \par Stability: Stable \par Neuro: AIN, PIN, Ulnar nerve intact to motor\par Sensation: Intact to light touch throughout  [de-identified] : Images were reviewed from  dated 6.4.2021.\par \par 3 views of right shoulder were obtained today, 07/19/2021, that show no acute fracture or dislocation. There is no glenohumeral and mild AC joint degenerative change seen. Type II acromion. There is no significant malalignment. No significant other obvious osseous abnormality, otherwise unremarkable. \par \par MRI right shoulder dated 7.19.2021 shows multifocal RTC tendinosis with articular sided fraying/shallow partial-thickness tearing of the supraspinatus tendon, and partial-thickness insertional tear of the subscapularis tendon. Degeneration of the labrum. Moderate AC joint arthrosis.

## 2021-07-28 NOTE — PROCEDURE
[de-identified] : Injection: Right shoulder (Subacromial).\par Indication: Adhesive Capsulitis. \par \par A discussion was had with the patient regarding this procedure and all questions were answered. All risks, benefits and alternatives were discussed. These included but were not limited to bleeding, infection, and allergic reaction. Alcohol was used to clean the skin, and betadine was used to sterilize and prep the area in the posterior aspect of the right shoulder. Ethyl chloride spray was then used as a topical anesthetic. A 21-gauge needle was used to inject 4cc of 1% lidocaine and 1cc of 40mg/ml methylprednisolone into the right subacromial space. A sterile bandage was then applied. The patient tolerated the procedure well and there were no complications.

## 2021-07-28 NOTE — DISCUSSION/SUMMARY
[de-identified] : 61 y/o male with right shoulder adhesive capsulitis.\par \par Clinically, patient has painful ROM and loss of motion consistent with MRI findings that show inflammatory dysfunction through the rotator interval and long head biceps tendon.  Degenerative tendinosis on MRI also of rotator cuff without high-grade tear.  There is a painful gradual loss of active and passive glenohumeral motion. This is likely due to progressive fibrosis and contracture of the glenohumeral joint. This is referred to as adhesive capsulitis or "frozen shoulder".  Complex given the patient is diabetic.\par \par I discussed that this occurs in approximately 2-5% of the population, and can affects the contralateral shoulder in approximately 20-30% of patients. This can be due to a primary idiopathic condition, or because of a secondary underlying structural condition. I discussed the 4 stages of adhesive capsulitis. Stage I refers to an inflammatory condition that causes night pain/discomfort with associated full passive ROM. Stage II results in severe pain and restriction of motion during a hyper-inflammatory synovitis. Stage III results in profound tethered stiffness throughout range of motion without acute inflammatory changes. Stage IV results in stiffness with minimal residual pain and dysfunction.I discussed that the natural history of the condition is self-limiting however, this may take up to 12-24 months. Nonoperative treatment includes pharmacological treatment of the inflammation (including NSAID's, intra-articular corticosteroids) and aggressive physical therapy (if tolerated), surgery can address capsular contraction with release/manipulation under anesthesia.\par \par Injection therapy was provided for therapeutic and symptomatic relief. \par \par Recommendation: Conservative care & observation, this includes rest/activity avoidance until less symptomatic with subsequent gradual return to full activity as tolerated. Patient may also use OTC NSAID's or acetaminophen as tolerated, with application of ice to the area 2-3x daily for 20 minutes after periods of activity. \par \par Follow-up 3 months.

## 2021-07-28 NOTE — ADDENDUM
[FreeTextEntry1] : This note was written by Alysha Acevedo on 07/28/2021 acting solely as a scribe for Dr. Leonard Sandoval.\par \par All medical record entries made by the Scribe were at my, Dr. Leonard Sandoval, direction and personally dictated by me on 07/28/2021. I have personally reviewed the chart and agree that the record accurately reflects my personal performance of the history, physical exam, assessment and plan.

## 2021-09-21 ENCOUNTER — APPOINTMENT (OUTPATIENT)
Dept: ORTHOPEDIC SURGERY | Facility: CLINIC | Age: 61
End: 2021-09-21
Payer: MEDICARE

## 2021-09-21 VITALS — HEIGHT: 71 IN | WEIGHT: 210 LBS | BODY MASS INDEX: 29.4 KG/M2

## 2021-09-21 DIAGNOSIS — M75.01 ADHESIVE CAPSULITIS OF RIGHT SHOULDER: ICD-10-CM

## 2021-09-21 PROCEDURE — 99213 OFFICE O/P EST LOW 20 MIN: CPT

## 2021-09-28 PROBLEM — M75.01 ADHESIVE CAPSULITIS OF RIGHT SHOULDER: Status: ACTIVE | Noted: 2021-07-28

## 2021-09-28 NOTE — PHYSICAL EXAM
[de-identified] : Oriented to time, place, person\par Mood: Normal\par Affect: Normal\par Appearance: Healthy, well appearing, no acute distress.\par Gait: Normal\par Assistive Devices: None\par \par Right shoulder exam:\par \par Inspection: No malalignment, No defects, No atrophy\par Skin: No masses, No lesions\par Neck: Negative Spurling, full ROM, no pain with ROM\par AROM: FF to 150, abduction to 90, ER to 45, IR to lower lumbar \par Painful arc ROM: Pain with terminal motion\par Tenderness: No bicipital tenderness, no tenderness to greater tuberosity/RTC insertion, no anterior shoulder/lesser tuberosity tenderness\par Strength: 5/5 ER, 5/5 IR in adduction, 4/5 supraspinatus testing, negative [place]'s test\par AC joint: No TTP/pain with cross arm testing\par Biceps: Speed Negative, Yergason Negative \par Impingement test: + Baron, + Neer\par Vasc: 2+ radial pulse \par Stability: Stable \par Neuro: AIN, PIN, Ulnar nerve intact to motor\par Sensation: Intact to light touch throughout  [de-identified] : Images were reviewed from  dated 6.4.2021.\par \par 3 views of right shoulder were obtained today, 07/19/2021, that show no acute fracture or dislocation. There is no glenohumeral and mild AC joint degenerative change seen. Type II acromion. There is no significant malalignment. No significant other obvious osseous abnormality, otherwise unremarkable. \par \par MRI right shoulder dated 7.19.2021 shows multifocal RTC tendinosis with articular sided fraying/shallow partial-thickness tearing of the supraspinatus tendon, and partial-thickness insertional tear of the subscapularis tendon. Degeneration of the labrum. Moderate AC joint arthrosis.

## 2021-09-28 NOTE — HISTORY OF PRESENT ILLNESS
[de-identified] : 60 year old diabetic male presents today for follow up of right shoulder pain since February 2021. He underwent R shoulder subacromial injection for adhesive capsulitis on 7/28. States that the injection helped mildly. Had been participating with PT since August till last week, but only felt temporary improvement. Localizes pain to the lateral shoulder/arm. Voltaren gel is helpful temporarily. Denies clicking sensation, but reports pinching. Denies numbness or tingling. No prior injuries. Patient reports that pain wakes him up at night.

## 2021-09-28 NOTE — DISCUSSION/SUMMARY
[de-identified] : 61 y/o male with right shoulder adhesive capsulitis.\par \par Symptomatically improved, but continues to have pain consistent with adhesive capsulitis.  Patient is undergoing physical therapy with some relief of symptoms.  We continue to recommend nonoperative treatment including pharmacological treatment of the inflammation (including NSAID's, intra-articular corticosteroids) and aggressive physical therapy (if tolerated), surgery can address capsular contraction with release/manipulation under anesthesia.\par \par Recommendation: Continued conservative care & observation, PT, this includes rest/activity avoidance until less symptomatic, OTC NSAID's or acetaminophen as tolerated, with application of ice to the area 2-3x daily for 20 minutes after periods of activity. \par \par Follow-up 3 months. \par  \par

## 2021-10-25 ENCOUNTER — APPOINTMENT (OUTPATIENT)
Dept: ORTHOPEDIC SURGERY | Facility: CLINIC | Age: 61
End: 2021-10-25

## 2021-12-21 ENCOUNTER — APPOINTMENT (OUTPATIENT)
Dept: ORTHOPEDIC SURGERY | Facility: CLINIC | Age: 61
End: 2021-12-21

## 2022-12-13 NOTE — H&P PST ADULT - [facility]
[address] ED      CHIEF COMPLAINT  Eye Pain (Yesterday was Cutting wood and thinks a piece is in his eye. )       [address] is a 65 y.o. male  who presents to the ED complaining of possible foreign body to right eye. Was cutting wood yesterday. Eye red, tearing. Nose seems to be running also. Was not wearing eye protection. No vision changes. No fevers. Did not flush it out. Does not wear contacts or glasses otherwise. No other complaints, modifying factors or associated symptoms. I have reviewed the following from the nursing documentation. No past medical history on file.   Past Surgical History:   Procedure Laterality Date    CLEFT PALATE REPAIR       Family History   Problem Relation Age of Onset    Cancer Mother         small cell lung cancer    High Blood Pressure Mother     Arthritis Father     High Blood Pressure Father      Social History     Socioeconomic History    Marital status: Single     Spouse name: Not on file    Number of children: Not on file    Years of education: Not on file    Highest education level: Not on file   Occupational History    Not on file   Tobacco Use    Smoking status: Every Day     Packs/day: 1.50     Years: 40.00     Pack years: 60.00     Types: Cigarettes    Smokeless tobacco: Never   Vaping Use    Vaping Use: Never used   Substance and Sexual Activity    Alcohol use: Yes     Comment: once a year    Drug use: Yes     Types: Marijuana (Weed), Cocaine     Comment: occassional cocaine and marijunana    Sexual activity: Yes     Partners: Female   Other Topics Concern    Not on file   Social History Narrative    Not on file     Social Determinants of Health     Financial Resource Strain: Not on file   Food Insecurity: Not on file   Transportation Needs: Not on file   Physical Activity: Not on file   Stress: Not on file   Social Connections: Not on file   Intimate Partner Violence: Not on file   Housing Stability: Not on file     No current facility-administered medications for this encounter. Current Outpatient Medications   Medication Sig Dispense Refill    erythromycin (ROMYCIN) 5 MG/GM ophthalmic ointment Apply 0.5 inch ribbon 4 times daily to the right lower eyelid margin. 1 g 0    naproxen (NAPROSYN) 500 MG tablet TAKE ONE TABLET BY MOUTH TWICE A DAY WITH MEALS 60 tablet 0    metoprolol succinate (TOPROL XL) 25 MG extended release tablet TAKE 1/2 TABLET BY MOUTH DAILY 45 tablet 1    atorvastatin (LIPITOR) 20 MG tablet TAKE ONE TABLET BY MOUTH DAILY 90 tablet 3     No Known Allergies    REVIEW OF SYSTEMS  10 systems reviewed, pertinent positives per HPI otherwise noted to be negative. PHYSICAL EXAM  BP (!) 141/89   Pulse 66   Temp 98.1 °F (36.7 °C) (Oral)   Resp 16   Ht 5' 11\" (1.803 m)   Wt 165 lb (74.8 kg)   SpO2 98%   BMI 23.01 kg/m²    GENERAL APPEARANCE: Awake and alert. Cooperative. No acute distress. HENT: Normocephalic. Atraumatic. Mucous membranes are moist.  No drooling or stridor. NECK: Supple. EYE:  Ptosis and proptosis are absent. Eyelid edema, erythema, and warmth are absent. Raccoon Eyes are absent. Conjunctivas are clear. No gross foreign body visible. Subconjunctival hemorrhages, scleral icterus, hypopyon, and hyphema are absent . Extraocular muscles are intact. Pupils equal round and reactive to light. Tetracaine was instilled in the eye with resolution of their pain. Using the Martha Nilo lamp and fluorescein, corneal foreign bodies and ulcerations are absent. There is a corneal abrasion at the 10 o'clock position of the right eye. Negative Dameon test. The lids were everted without foreign bodies or other lesions identified. HEART/CHEST: RRR. No murmurs. LUNGS: Respirations unlabored. CTAB. Good air exchange. Speaking comfortably in full sentences. ABDOMEN: No tenderness. Soft. Non-distended. No masses. No organomegaly. No guarding or rebound.    MUSCULOSKELETAL: No extremity edema. Compartments soft. No deformity. No tenderness in the extremities. All extremities neurovascularly intact. SKIN: Warm and dry. No acute rashes. NEUROLOGICAL: Alert and oriented. CN's 2-12 intact. No gross facial drooping. No gross focal deficits. PSYCHIATRIC: Normal mood and affect. LABS  I have reviewed all labs for this visit. No results found for this visit on 12/13/22. RADIOLOGY  None    ED COURSE/MDM  Patient seen and evaluated. Old records reviewed. Patient presenting with foreign body sensation to the right eye. I performed a very thorough exam including everting the patient's eyelid and performing even a sweep with cotton swab as well as visual inspection under the upper and lower eyelids as well as to the globe itself and no foreign bodies are visible. There is a corneal abrasion noted on fluorescein exam.  We will place the patient on #3 ointment for treatment of his abrasion. He does not wear contacts at all. He is to follow closely with ophthalmology and was given referral to on-call provider. Patient was in agreement of plan. Patient was discharged at this time. Reasons to return to the ER were discussed      I, Dr. Princess Sis MD, am the primary clinician of record. Is this patient to be included in the SEP-1 Core Measure? No   Exclusion criteria - the patient is NOT to be included for SEP-1 Core Measure due to: Infection is not suspected     During the patient's ED course, the patient was given:  Medications - No data to display     CLINICAL IMPRESSION  1. Abrasion of right cornea, initial encounter        Blood pressure (!) 141/89, pulse 66, temperature 98.1 °F (36.7 °C), temperature source Oral, resp. rate 16, height 5' 11\" (1.803 m), weight 165 lb (74.8 kg), SpO2 98 %. Mya Pete was discharged to home in stable condition. Patient was given scripts for the following medications. I counseled patient how to take these medications. New Prescriptions    ERYTHROMYCIN (ROMYCIN) 5 MG/GM OPHTHALMIC OINTMENT    Apply 0.5 inch ribbon 4 times daily to the right lower eyelid margin. Follow-up with:  DO Juan David Beauchamp  [phone]    Schedule an appointment as soon as possible for a visit in 2 days  For recheck    Robert Hein MD  [id number] [facility]    Schedule an appointment as soon as possible for a visit   to follow up with ophthalmology (eye doctor)    DISCLAIMER: This chart was created using Dragon dictation software. Efforts were made by me to ensure accuracy, however some errors may be present due to limitations of this technology and occasionally words are not transcribed correctly.         Vickey Samuels MD  12/13/22 4148 Class II - visualization of the soft palate, fauces, and uvula
